# Patient Record
Sex: FEMALE | URBAN - NONMETROPOLITAN AREA
[De-identification: names, ages, dates, MRNs, and addresses within clinical notes are randomized per-mention and may not be internally consistent; named-entity substitution may affect disease eponyms.]

---

## 2021-09-21 ENCOUNTER — OFFICE VISIT (OUTPATIENT)
Dept: URBAN - NONMETROPOLITAN AREA CLINIC 6 | Facility: CLINIC | Age: 31
End: 2021-09-21

## 2021-09-21 DIAGNOSIS — H11.153 PINGUECULA, BILATERAL: ICD-10-CM

## 2021-09-21 DIAGNOSIS — H52.03 HYPERMETROPIA, BILATERAL: Primary | ICD-10-CM

## 2021-09-21 PROCEDURE — S0620 ROUTINE OPHTHALMOLOGICAL EXA: HCPCS | Performed by: OPTOMETRIST

## 2021-09-21 ASSESSMENT — KERATOMETRY
OS: 42.99
OD: 42.73

## 2021-09-21 ASSESSMENT — VISUAL ACUITY
OS: 20/20
OD: 20/20

## 2021-09-21 ASSESSMENT — INTRAOCULAR PRESSURE
OD: 15
OS: 13

## 2021-09-21 NOTE — IMPRESSION/PLAN
Impression: Hypermetropia, bilateral: H52.03. Plan: Diagnosis discussed with patient in detail. Monitor. No need for correction at this time.

## 2021-09-21 NOTE — IMPRESSION/PLAN
Impression: Pinguecula, bilateral: H11.153. Plan: Patient education regarding findings. No treatment recommended at this time. Continue to monitor annually.